# Patient Record
Sex: FEMALE | Race: WHITE | URBAN - METROPOLITAN AREA
[De-identification: names, ages, dates, MRNs, and addresses within clinical notes are randomized per-mention and may not be internally consistent; named-entity substitution may affect disease eponyms.]

---

## 2019-08-11 ENCOUNTER — EMERGENCY (EMERGENCY)
Facility: HOSPITAL | Age: 64
LOS: 1 days | Discharge: HOME | End: 2019-08-11
Attending: EMERGENCY MEDICINE | Admitting: EMERGENCY MEDICINE
Payer: COMMERCIAL

## 2019-08-11 VITALS
TEMPERATURE: 99 F | HEART RATE: 100 BPM | DIASTOLIC BLOOD PRESSURE: 88 MMHG | OXYGEN SATURATION: 96 % | RESPIRATION RATE: 14 BRPM | SYSTOLIC BLOOD PRESSURE: 147 MMHG | HEIGHT: 64 IN | WEIGHT: 250 LBS

## 2019-08-11 VITALS
DIASTOLIC BLOOD PRESSURE: 76 MMHG | OXYGEN SATURATION: 96 % | HEART RATE: 99 BPM | SYSTOLIC BLOOD PRESSURE: 145 MMHG | TEMPERATURE: 98 F | RESPIRATION RATE: 16 BRPM

## 2019-08-11 PROCEDURE — 71046 X-RAY EXAM CHEST 2 VIEWS: CPT

## 2019-08-11 PROCEDURE — 99284 EMERGENCY DEPT VISIT MOD MDM: CPT | Mod: 25

## 2019-08-11 PROCEDURE — 73030 X-RAY EXAM OF SHOULDER: CPT | Mod: 26,LT

## 2019-08-11 PROCEDURE — 99284 EMERGENCY DEPT VISIT MOD MDM: CPT

## 2019-08-11 PROCEDURE — 73030 X-RAY EXAM OF SHOULDER: CPT

## 2019-08-11 PROCEDURE — 71046 X-RAY EXAM CHEST 2 VIEWS: CPT | Mod: 26

## 2019-08-11 RX ORDER — ALBUTEROL 90 UG/1
2 AEROSOL, METERED ORAL
Qty: 0 | Refills: 0 | DISCHARGE

## 2019-08-11 RX ORDER — IBUPROFEN 200 MG
400 TABLET ORAL ONCE
Refills: 0 | Status: COMPLETED | OUTPATIENT
Start: 2019-08-11 | End: 2019-08-11

## 2019-08-11 RX ORDER — ALBUTEROL 90 UG/1
0 AEROSOL, METERED ORAL
Qty: 0 | Refills: 0 | DISCHARGE

## 2019-08-11 RX ORDER — ATORVASTATIN CALCIUM 80 MG/1
1 TABLET, FILM COATED ORAL
Qty: 0 | Refills: 0 | DISCHARGE

## 2019-08-11 RX ORDER — TELMISARTAN AND HYDROCHLOROTHIAZIDE 40; 12.5 MG/1; MG/1
1 TABLET ORAL
Qty: 0 | Refills: 0 | DISCHARGE

## 2019-08-11 RX ORDER — LANSOPRAZOLE 15 MG/1
1 CAPSULE, DELAYED RELEASE ORAL
Qty: 0 | Refills: 0 | DISCHARGE

## 2019-08-11 RX ADMIN — Medication 400 MILLIGRAM(S): at 12:41

## 2019-08-11 RX ADMIN — Medication 400 MILLIGRAM(S): at 13:10

## 2019-08-11 NOTE — ED PROVIDER NOTE - CARE PROVIDER_API CALL
Doni Stringer)  Orthopaedic Sports Medicine; Orthopaedic Surgery  [address] [facility] [address]  [place] [address]  Phone: [phone]  Fax: [phone]  Follow Up Time: 1-3 Days

## 2019-08-11 NOTE — ED PROVIDER NOTE - CHPI ED SYMPTOMS NEG
no nausea, no vomiting, no weakness, no numbness/no dizziness/no loss of consciousness/no laceration no laceration/no loss of consciousness/no nausea, no vomiting, no weakness, no numbness

## 2019-08-11 NOTE — ED PROVIDER NOTE - MUSCULOSKELETAL, MLM
Spine appears normal, range of motion is not limited, tender right shoulder, FROM. other extremities nl.
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
## 2019-08-11 NOTE — ED ADULT NURSE NOTE - CHPI ED NUR SYMPTOMS NEG
no decreased eating/drinking/no crying/no difficulty bearing weight/no bruising/no headache/no disorientation/no dizziness/no laceration/no sleeping issues/no acting out behaviors/no back pain/no loss of consciousness
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
## 2019-08-11 NOTE — ED ADULT NURSE NOTE - PMH
Asthma    CA - Cardiac Arrest  12/10/10  COPD (Chronic Obstructive Pulmonary Disease)    Hyperlipemia    Hypertension    Myocardial Infarction  12/10/2010

## 2021-02-08 ENCOUNTER — OUTPATIENT (OUTPATIENT)
Dept: OUTPATIENT SERVICES | Facility: HOSPITAL | Age: 66
LOS: 1 days | End: 2021-02-08
Payer: MEDICARE

## 2021-02-08 DIAGNOSIS — Z20.828 CONTACT WITH AND (SUSPECTED) EXPOSURE TO OTHER VIRAL COMMUNICABLE DISEASES: ICD-10-CM

## 2021-02-08 LAB — SARS-COV-2 RNA SPEC QL NAA+PROBE: DETECTED

## 2021-02-08 PROCEDURE — U0005: CPT

## 2021-02-08 PROCEDURE — C9803: CPT

## 2021-02-08 PROCEDURE — U0003: CPT

## 2021-02-09 DIAGNOSIS — Z20.828 CONTACT WITH AND (SUSPECTED) EXPOSURE TO OTHER VIRAL COMMUNICABLE DISEASES: ICD-10-CM

## 2023-11-06 NOTE — ED PROVIDER NOTE - CROS ED MUSC ALL NEG
Goal Outcome Evaluation:   Per MD orders pt to discharge today, CCP working with pt for placement to shelter, AVS to be printed and reviewed at bs with pt.                       - - -

## 2024-11-19 NOTE — ED ADULT TRIAGE NOTE - PAIN RATING/NUMBER SCALE (0-10): ACTIVITY
Pre Procedure History & Physical    Chief Complaint: Colonoscopy    HPI: Patient is 49 years old female presented today for average risk screening colonoscopy.  She denies any family history of colorectal cancer or polyps.  No obvious GI symptoms of bleed, altered bowel habits, abnormal weight loss.  Patient does not take any blood thinner or anticoagulant medications      Past Medical History:   Past Medical History:   Diagnosis Date    Chronic pain     knee    Encounter for screening for malignant neoplasm of colon 2024       Past Surgical History:  Past Surgical History:   Procedure Laterality Date    CERVICAL BIOPSY  W/ LOOP ELECTRODE EXCISION       SECTION      CHOLECYSTECTOMY      COLONOSCOPY      VEIN LIGATION AND STRIPPING BILATERAL         Family History:  Family History   Problem Relation Age of Onset    Uterine cancer Mother     Endometrial cancer Mother     Endometrial cancer Maternal Aunt     Breast cancer Neg Hx     Colon cancer Neg Hx        Social History:   reports that she has quit smoking. She has never used smokeless tobacco. She reports current alcohol use. She reports that she does not use drugs.    Medications:   Medications Prior to Admission   Medication Sig Dispense Refill Last Dose/Taking    Cetirizine HCl 10 MG capsule Take 10 mg by mouth Daily.   2024    diphenhydrAMINE (BENADRYL) 25 mg capsule Take 2 capsules by mouth.   2024    esomeprazole (NexIUM) 10 MG packet Take  by mouth.   2024    HYDROcodone-acetaminophen (NORCO) 5-325 MG per tablet Take 1 tablet 4 times a day by oral route for 30 days.   2024    ibuprofen (ADVIL,MOTRIN) 800 MG tablet Take 1 tablet by mouth.   Past Week    melatonin 3 MG tablet Take 10 mg by mouth Daily.   2024    Norethin-Eth Estrad-Fe Biphas (Lo Loestrin Fe) 1 MG-10 MCG / 10 MCG tablet Take 1 tablet by mouth Daily. 84 tablet 3 2024    Omega-3 Fatty Acids (FISH OIL) 1000 MG capsule capsule Take  by mouth.   Past  Week    Semaglutide-Weight Management 0.25 MG/0.5ML solution auto-injector Inject 0.5 mL every week by subcutaneous route.   11/8/2024       Allergies:  Patient has no known allergies.      Objective     Blood pressure 121/82, pulse 71, temperature 97.8 °F (36.6 °C), temperature source Temporal, resp. rate 19, weight 67.4 kg (148 lb 9.4 oz), SpO2 99%, not currently breastfeeding.    Physical Exam   Constitutional: Pt is oriented to person, place, and time and well-developed, well-nourished, and in no distress.   Mouth/Throat: Oropharynx is clear and moist.   Neck: Normal range of motion.   Cardiovascular: Normal rate, regular rhythm and normal heart sounds.    Pulmonary/Chest: Effort normal and breath sounds normal.   Abdominal: Soft. Nontender  Skin: Skin is warm and dry.   Psychiatric: Mood, memory, affect and judgment normal.     Assessment & Plan     Diagnosis:    Average risk screening colonoscopy    Anticipated Surgical Procedure:    Colonoscopy    The risks, benefits, and alternatives of this procedure have been discussed with the patient or the responsible party- the patient understands and agrees to proceed.        Electronically signed by Kendall Wellington MD, 11/19/24, 7:35 AM EST.             6